# Patient Record
Sex: FEMALE | Race: WHITE | NOT HISPANIC OR LATINO | Employment: FULL TIME | URBAN - METROPOLITAN AREA
[De-identification: names, ages, dates, MRNs, and addresses within clinical notes are randomized per-mention and may not be internally consistent; named-entity substitution may affect disease eponyms.]

---

## 2023-12-07 PROBLEM — R60.0 LOWER EXTREMITY EDEMA: Status: ACTIVE | Noted: 2023-12-07

## 2023-12-07 PROBLEM — M48.02 SPINAL STENOSIS IN CERVICAL REGION: Status: ACTIVE | Noted: 2023-12-07

## 2023-12-07 PROBLEM — Z98.1 S/P CERVICAL SPINAL FUSION: Status: ACTIVE | Noted: 2023-12-07

## 2023-12-07 PROBLEM — J06.9 ACUTE URI: Status: ACTIVE | Noted: 2023-12-07

## 2023-12-07 PROBLEM — R94.39 ABNORMAL STRESS TEST: Status: ACTIVE | Noted: 2022-11-23

## 2023-12-07 PROBLEM — J40 SINOBRONCHITIS: Status: ACTIVE | Noted: 2023-12-07

## 2023-12-07 PROBLEM — J32.9 SINOBRONCHITIS: Status: ACTIVE | Noted: 2023-12-07

## 2023-12-07 PROBLEM — R19.7 DIARRHEA: Status: ACTIVE | Noted: 2023-12-07

## 2023-12-07 PROBLEM — I10 BENIGN ESSENTIAL HYPERTENSION: Status: ACTIVE | Noted: 2022-12-28

## 2023-12-07 PROBLEM — I49.9 IRREGULAR HEART BEAT: Status: ACTIVE | Noted: 2023-12-07

## 2023-12-07 PROBLEM — R05.3 PERSISTENT COUGH: Status: ACTIVE | Noted: 2023-12-07

## 2023-12-07 PROBLEM — I25.10 CORONARY ATHEROSCLEROSIS OF NATIVE CORONARY ARTERY: Status: ACTIVE | Noted: 2023-12-07

## 2023-12-07 PROBLEM — I82.409 DVT (DEEP VENOUS THROMBOSIS) (MULTI): Status: ACTIVE | Noted: 2023-12-07

## 2023-12-07 PROBLEM — R07.9 CHEST PAIN: Status: ACTIVE | Noted: 2022-12-12

## 2023-12-07 PROBLEM — K21.9 GASTROESOPHAGEAL REFLUX DISEASE: Status: ACTIVE | Noted: 2023-12-07

## 2023-12-07 PROBLEM — R32 INCONTINENCE IN FEMALE: Status: ACTIVE | Noted: 2023-12-07

## 2023-12-07 PROBLEM — R04.0 RECURRENT EPISTAXIS: Status: ACTIVE | Noted: 2023-12-07

## 2023-12-07 PROBLEM — J02.9 ACUTE PHARYNGITIS: Status: ACTIVE | Noted: 2023-12-07

## 2023-12-07 PROBLEM — E78.5 HYPERLIPIDEMIA: Status: ACTIVE | Noted: 2022-12-28

## 2023-12-07 PROBLEM — Z86.79 HISTORY OF HYPERTENSION: Status: ACTIVE | Noted: 2023-12-07

## 2023-12-07 PROBLEM — L29.9 EAR ITCHING: Status: ACTIVE | Noted: 2023-12-07

## 2023-12-07 RX ORDER — ALBUTEROL SULFATE 90 UG/1
AEROSOL, METERED RESPIRATORY (INHALATION)
COMMUNITY
Start: 2020-05-11 | End: 2023-12-21 | Stop reason: WASHOUT

## 2023-12-07 RX ORDER — DARIFENACIN 15 MG/1
15 TABLET, EXTENDED RELEASE ORAL
COMMUNITY
End: 2023-12-21 | Stop reason: WASHOUT

## 2023-12-07 RX ORDER — CALCIUM CARBONATE/VITAMIN D3 600MG-5MCG
TABLET ORAL
COMMUNITY
Start: 2017-07-17

## 2023-12-07 RX ORDER — HYDROCHLOROTHIAZIDE 25 MG/1
TABLET ORAL
COMMUNITY
Start: 2020-07-15 | End: 2023-12-21 | Stop reason: WASHOUT

## 2023-12-07 RX ORDER — FLUOXETINE HYDROCHLORIDE 20 MG/1
CAPSULE ORAL
COMMUNITY
Start: 2015-12-21

## 2023-12-07 RX ORDER — FUROSEMIDE 20 MG/1
TABLET ORAL EVERY 24 HOURS
COMMUNITY
Start: 2021-05-12

## 2023-12-07 RX ORDER — NIFEDIPINE 30 MG/1
30 TABLET, FILM COATED, EXTENDED RELEASE ORAL
COMMUNITY
Start: 2023-05-05 | End: 2023-12-21 | Stop reason: WASHOUT

## 2023-12-07 RX ORDER — DIPHENOXYLATE HYDROCHLORIDE AND ATROPINE SULFATE 2.5; .025 MG/1; MG/1
TABLET ORAL 4 TIMES DAILY PRN
COMMUNITY
Start: 2022-03-13 | End: 2023-12-21 | Stop reason: WASHOUT

## 2023-12-07 RX ORDER — PRAVASTATIN SODIUM 80 MG/1
1 TABLET ORAL NIGHTLY
COMMUNITY
Start: 2020-07-23

## 2023-12-07 RX ORDER — LOSARTAN POTASSIUM 50 MG/1
TABLET ORAL
COMMUNITY
Start: 2023-08-08 | End: 2023-12-21 | Stop reason: WASHOUT

## 2023-12-07 RX ORDER — POLYMYXIN B SULFATE AND TRIMETHOPRIM 1; 10000 MG/ML; [USP'U]/ML
1 SOLUTION OPHTHALMIC
COMMUNITY
Start: 2023-05-17 | End: 2023-12-21 | Stop reason: WASHOUT

## 2023-12-07 RX ORDER — LOSARTAN POTASSIUM 25 MG/1
TABLET ORAL
COMMUNITY
Start: 2020-07-15 | End: 2023-12-21 | Stop reason: WASHOUT

## 2023-12-07 RX ORDER — TRIAMTERENE AND HYDROCHLOROTHIAZIDE 37.5; 25 MG/1; MG/1
1 CAPSULE ORAL
COMMUNITY
End: 2023-12-21 | Stop reason: WASHOUT

## 2023-12-07 RX ORDER — NAPROXEN SODIUM 220 MG/1
TABLET, FILM COATED ORAL
COMMUNITY
Start: 2008-04-10

## 2023-12-07 RX ORDER — CETIRIZINE HYDROCHLORIDE 10 MG/1
TABLET ORAL
COMMUNITY
Start: 2020-07-15

## 2023-12-07 RX ORDER — MINERAL OIL
180 ENEMA (ML) RECTAL
COMMUNITY
End: 2023-12-21 | Stop reason: WASHOUT

## 2023-12-07 RX ORDER — TOLTERODINE TARTRATE 2 MG/1
4 TABLET, EXTENDED RELEASE ORAL
COMMUNITY
Start: 2017-07-17

## 2023-12-07 RX ORDER — OFLOXACIN 3 MG/ML
SOLUTION/ DROPS OPHTHALMIC
COMMUNITY
Start: 2023-05-17 | End: 2023-12-21 | Stop reason: WASHOUT

## 2023-12-07 RX ORDER — NIFEDIPINE 60 MG/1
60 TABLET, EXTENDED RELEASE ORAL DAILY
COMMUNITY
Start: 2022-12-21 | End: 2023-12-21 | Stop reason: WASHOUT

## 2023-12-07 RX ORDER — OMEPRAZOLE 20 MG/1
20 CAPSULE, DELAYED RELEASE ORAL DAILY
COMMUNITY

## 2023-12-07 RX ORDER — MOXIFLOXACIN 5 MG/ML
SOLUTION/ DROPS OPHTHALMIC
COMMUNITY
Start: 2023-06-05 | End: 2023-12-21 | Stop reason: WASHOUT

## 2023-12-07 RX ORDER — LOSARTAN POTASSIUM 100 MG/1
TABLET ORAL
COMMUNITY
Start: 2023-09-08

## 2023-12-07 RX ORDER — LOSARTAN POTASSIUM AND HYDROCHLOROTHIAZIDE 12.5; 5 MG/1; MG/1
1 TABLET ORAL
COMMUNITY
End: 2023-12-21 | Stop reason: WASHOUT

## 2023-12-18 NOTE — PROGRESS NOTES
Primary Cardiologist: Dr. Clifford    Chief Complaint:   No chief complaint on file.     History Of Present Illness:    Leigha Lazaro is a 53 y.o. female with past medical history of HTN, DL, chest pain (normal cath 7/2020), Symptomatic PVC's, LE edema, superficial blood clot in leg x3 (patient verifies no h/o DVT), anterior fusion neck surgery who presents for 6 month follow up.      Today, Leigha Lazaro is feeling well overall, no cardiac concerns, no recent hospitalizations.  Denies chest pain/pressure, no dizziness or lightheadedness, no shortness of breath, and no palpitations. She has chronic L>R lower extremity edema that is currently stable, denies orthopnea or PND.     Last Recorded Vitals:  Visit Vitals  /82   Pulse 91   Ht 1.829 m (6')   Wt 99.3 kg (219 lb)   BMI 29.70 kg/m²   Smoking Status Never   BSA 2.25 m²        Past Medical History:  She has a past medical history of Personal history of other diseases of the circulatory system.    Past Surgical History:  She has a past surgical history that includes Septoplasty (07/17/2017); Hysterectomy (07/17/2017); Bladder surgery (07/17/2017); and Tonsillectomy (07/17/2017).      Social History:  She reports that she has never smoked. She does not have any smokeless tobacco history on file. No history on file for alcohol use and drug use.    Family History:  No family history on file.     Allergies:  Penicillins, Erythromycin, and Nitrofurantoin monohyd/m-cryst    Outpatient Medications:  Current Outpatient Medications   Medication Instructions    aspirin 81 mg chewable tablet oral    calcium carbonate-vitamin D3 600 mg-5 mcg (200 unit) tablet oral    cetirizine (ZyrTEC) 10 mg tablet oral    FLUoxetine (PROzac) 20 mg capsule oral    furosemide (Lasix) 20 mg tablet Every 24 hours    losartan (Cozaar) 100 mg tablet     omeprazole (PRILOSEC) 20 mg, oral, Daily, Take (1) capsule by mouth once daily in the morning before breakfast.    pravastatin (Pravachol) 80  mg tablet 1 tablet, oral, Nightly    tolterodine (DETROL) 4 mg, oral         Review of Systems   Constitutional: Negative for malaise/fatigue.   HENT: Negative.     Eyes: Negative.    Cardiovascular:  Positive for leg swelling (chronic, stable). Negative for chest pain and dyspnea on exertion.   Respiratory:  Negative for shortness of breath.    Endocrine: Negative.    Hematologic/Lymphatic: Negative.    Skin: Negative.    Musculoskeletal: Negative.    Gastrointestinal: Negative.    Genitourinary: Negative.    Neurological: Negative.  Negative for dizziness and light-headedness.   Psychiatric/Behavioral: Negative.          Physical Exam  Vitals reviewed.   Constitutional:       Appearance: Normal appearance.   HENT:      Head: Normocephalic.      Mouth/Throat:      Mouth: Mucous membranes are moist.   Cardiovascular:      Rate and Rhythm: Normal rate and regular rhythm.      Pulses: Normal pulses.      Heart sounds: Normal heart sounds. No murmur heard.     No friction rub. No gallop.   Pulmonary:      Effort: Pulmonary effort is normal.      Breath sounds: Normal breath sounds. No wheezing, rhonchi or rales.   Abdominal:      General: Bowel sounds are normal.      Palpations: Abdomen is soft.   Musculoskeletal:         General: Normal range of motion.      Cervical back: Normal range of motion.      Right lower leg: No edema.      Left lower leg: No edema.   Skin:     General: Skin is warm and dry.   Neurological:      General: No focal deficit present.      Mental Status: She is alert and oriented to person, place, and time.   Psychiatric:         Mood and Affect: Mood normal.         Behavior: Behavior normal.         Thought Content: Thought content normal.         Judgment: Judgment normal.           Last Labs:  CBC -  Lab Results   Component Value Date    WBC 4.4 03/13/2022    HGB 12.9 03/13/2022    HCT 39.1 03/13/2022    MCV 90 03/13/2022     03/13/2022       CMP -  Lab Results   Component Value Date  "   CALCIUM 9.2 12/28/2022    PROT 6.8 03/13/2022    ALBUMIN 4.2 03/13/2022    AST 9 12/28/2022    ALT 11 12/28/2022    ALKPHOS 49 03/13/2022    BILITOT 0.4 03/13/2022       LIPID PANEL -   Lab Results   Component Value Date    CHOL 178 12/28/2022    TRIG 78 12/28/2022    HDL 71.7 12/28/2022    CHHDL 2.5 12/28/2022    LDLF 91 12/28/2022    VLDL 16 12/28/2022       RENAL FUNCTION PANEL -   Lab Results   Component Value Date    GLUCOSE 83 12/28/2022     12/28/2022    K 4.0 12/28/2022     12/28/2022    CO2 28 12/28/2022    ANIONGAP 10 12/28/2022    BUN 16 12/28/2022    CREATININE 0.76 12/28/2022    CALCIUM 9.2 12/28/2022    ALBUMIN 4.2 03/13/2022        No results found for: \"BNP\", \"HGBA1C\"    Last Cardiology Tests:  ECG:  No results found for this or any previous visit from the past 1095 days.      Echo:  No results found for this or any previous visit from the past 1095 days.      Ejection Fractions:  No results found for: \"EF\"    Cath:  [facility] 7/31/20 :No evidence of significant coronary artery disease.     Stress Test:  Exercise stress echo   Abnormal ECG with stress.   2. No clinical or echocardiographic evidence for ischemia at maximal infusion.   3. The adequate level of stress was achieved.   4. The Rome score is 5.  Cardiac Imaging:  No results found for this or any previous visit from the past 1095 days.        Lab review: I have personally reviewed the laboratory result(s)     Assessment/Plan   Leigha Lazaro is a 53 y.o. female with past medical history of HTN, DL, chest pain (normal cath 7/2020), Symptomatic PVC's, LE edema, superficial blood clot in leg x3 (patient verifies no h/o DVT), anterior fusion neck surgery who presents for 6 month follow up.    History of atypical chest pain   C 7/20: with no evidence of CAD  Exercise stress echo 12/22: No clinical or echo evidence for ischemia  Chest pain at last office visit improved after PPI   Today, denies any chest pain or pressure, " stable  Stable    Hypertension  Today's /82  Well controlled  Continue on current antihypertensives    Dyslipidemia  Lipid panel 12/20/2022: Cholesterol 178 HDL 71 LDL 91 and triglycerides 78  Continue on pravastatin, repeat FLP ordered    Chronic venous insufficiency  Patient has chronic lower extremity edema L>R and takes furosemide 20 mg daily which she states mostly controls her edema.  Today she states that during the summer months the edema is very bothersome to her and she thinks maybe she needs increase dose of furosemide for summer.   Will refer to vascular surgery for further evaluation and available treatment options.       Lora Napoles, APRN-CNP

## 2023-12-21 ENCOUNTER — OFFICE VISIT (OUTPATIENT)
Dept: CARDIOLOGY | Facility: CLINIC | Age: 53
End: 2023-12-21
Payer: COMMERCIAL

## 2023-12-21 VITALS
SYSTOLIC BLOOD PRESSURE: 120 MMHG | WEIGHT: 219 LBS | DIASTOLIC BLOOD PRESSURE: 82 MMHG | HEART RATE: 91 BPM | HEIGHT: 72 IN | BODY MASS INDEX: 29.66 KG/M2

## 2023-12-21 DIAGNOSIS — I10 BENIGN ESSENTIAL HYPERTENSION: Primary | ICD-10-CM

## 2023-12-21 DIAGNOSIS — I87.2 VENOUS INSUFFICIENCY: ICD-10-CM

## 2023-12-21 DIAGNOSIS — E78.5 HYPERLIPIDEMIA, UNSPECIFIED HYPERLIPIDEMIA TYPE: ICD-10-CM

## 2023-12-21 PROCEDURE — 3079F DIAST BP 80-89 MM HG: CPT | Performed by: CLINICAL NURSE SPECIALIST

## 2023-12-21 PROCEDURE — 3074F SYST BP LT 130 MM HG: CPT | Performed by: CLINICAL NURSE SPECIALIST

## 2023-12-21 PROCEDURE — 99213 OFFICE O/P EST LOW 20 MIN: CPT | Performed by: CLINICAL NURSE SPECIALIST

## 2023-12-21 ASSESSMENT — ENCOUNTER SYMPTOMS
HEMATOLOGIC/LYMPHATIC NEGATIVE: 1
EYES NEGATIVE: 1
ENDOCRINE NEGATIVE: 1
LIGHT-HEADEDNESS: 0
GASTROINTESTINAL NEGATIVE: 1
NEUROLOGICAL NEGATIVE: 1
MUSCULOSKELETAL NEGATIVE: 1
SHORTNESS OF BREATH: 0
DIZZINESS: 0
DYSPNEA ON EXERTION: 0
PSYCHIATRIC NEGATIVE: 1

## 2023-12-21 NOTE — PATIENT INSTRUCTIONS
Labs have been ordered for you to complete, please do fasting.  Call our office with any new cardiac concerns  Continue current medications  Continue heart-healthy diet. A diet low in sodium, low in cholesterol, limiting red meats and eating whole foods.   Follow up with Dr. Clifford in one year.

## 2023-12-22 ENCOUNTER — HOSPITAL ENCOUNTER (OUTPATIENT)
Dept: RADIOLOGY | Facility: HOSPITAL | Age: 53
Discharge: HOME | End: 2023-12-22
Payer: COMMERCIAL

## 2023-12-22 DIAGNOSIS — Z12.31 ENCOUNTER FOR SCREENING MAMMOGRAM FOR MALIGNANT NEOPLASM OF BREAST: ICD-10-CM

## 2023-12-22 PROCEDURE — 77067 SCR MAMMO BI INCL CAD: CPT

## 2023-12-22 PROCEDURE — 77063 BREAST TOMOSYNTHESIS BI: CPT | Mod: BILATERAL PROCEDURE | Performed by: RADIOLOGY

## 2023-12-22 PROCEDURE — 77067 SCR MAMMO BI INCL CAD: CPT | Mod: BILATERAL PROCEDURE | Performed by: RADIOLOGY

## 2023-12-29 ENCOUNTER — LAB (OUTPATIENT)
Dept: LAB | Facility: LAB | Age: 53
End: 2023-12-29
Payer: COMMERCIAL

## 2023-12-29 DIAGNOSIS — E78.5 HYPERLIPIDEMIA, UNSPECIFIED HYPERLIPIDEMIA TYPE: ICD-10-CM

## 2023-12-29 LAB
ALBUMIN SERPL BCP-MCNC: 4.2 G/DL (ref 3.4–5)
ALP SERPL-CCNC: 63 U/L (ref 33–110)
ALT SERPL W P-5'-P-CCNC: 12 U/L (ref 7–45)
ANION GAP SERPL CALC-SCNC: 11 MMOL/L (ref 10–20)
AST SERPL W P-5'-P-CCNC: 10 U/L (ref 9–39)
BILIRUB SERPL-MCNC: 0.7 MG/DL (ref 0–1.2)
BUN SERPL-MCNC: 13 MG/DL (ref 6–23)
CALCIUM SERPL-MCNC: 9.1 MG/DL (ref 8.6–10.3)
CHLORIDE SERPL-SCNC: 104 MMOL/L (ref 98–107)
CHOLEST SERPL-MCNC: 185 MG/DL (ref 0–199)
CHOLESTEROL/HDL RATIO: 3
CO2 SERPL-SCNC: 30 MMOL/L (ref 21–32)
CREAT SERPL-MCNC: 0.64 MG/DL (ref 0.5–1.05)
GFR SERPL CREATININE-BSD FRML MDRD: >90 ML/MIN/1.73M*2
GLUCOSE SERPL-MCNC: 85 MG/DL (ref 74–99)
HDLC SERPL-MCNC: 62.4 MG/DL
LDLC SERPL CALC-MCNC: 105 MG/DL
NON HDL CHOLESTEROL: 123 MG/DL (ref 0–149)
POTASSIUM SERPL-SCNC: 4.1 MMOL/L (ref 3.5–5.3)
PROT SERPL-MCNC: 6.5 G/DL (ref 6.4–8.2)
SODIUM SERPL-SCNC: 141 MMOL/L (ref 136–145)
TRIGL SERPL-MCNC: 86 MG/DL (ref 0–149)
VLDL: 17 MG/DL (ref 0–40)

## 2023-12-29 PROCEDURE — 80061 LIPID PANEL: CPT

## 2023-12-29 PROCEDURE — 80053 COMPREHEN METABOLIC PANEL: CPT

## 2023-12-29 PROCEDURE — 36415 COLL VENOUS BLD VENIPUNCTURE: CPT

## 2024-03-08 ENCOUNTER — OFFICE VISIT (OUTPATIENT)
Dept: PODIATRY | Facility: CLINIC | Age: 54
End: 2024-03-08
Payer: COMMERCIAL

## 2024-03-08 VITALS — BODY MASS INDEX: 29.66 KG/M2 | WEIGHT: 219 LBS | HEIGHT: 72 IN

## 2024-03-08 DIAGNOSIS — L60.0 INGROWING NAIL: Primary | ICD-10-CM

## 2024-03-08 DIAGNOSIS — M79.675 TOE PAIN, LEFT: ICD-10-CM

## 2024-03-08 PROCEDURE — 11730 AVULSION NAIL PLATE SIMPLE 1: CPT | Performed by: PODIATRIST

## 2024-03-08 PROCEDURE — 99202 OFFICE O/P NEW SF 15 MIN: CPT | Performed by: PODIATRIST

## 2024-03-10 NOTE — PROGRESS NOTES
CC: left ingrown nail(s).     HPI:  Patient presents complaining of loose and ingrown nail of the left hallux, been present for a period of time, no active odor or drainage present. Has tried some soaks and otc meds.    PCP: Dr. Luna  Last visit: 12-1-23     PMH  Past Medical History:   Diagnosis Date    Personal history of other diseases of the circulatory system     H/O: HTN (hypertension)     MEDS    Current Outpatient Medications:     aspirin 81 mg chewable tablet, Chew., Disp: , Rfl:     calcium carbonate-vitamin D3 600 mg-5 mcg (200 unit) tablet, Take by mouth., Disp: , Rfl:     cetirizine (ZyrTEC) 10 mg tablet, Take by mouth., Disp: , Rfl:     FLUoxetine (PROzac) 20 mg capsule, Take by mouth., Disp: , Rfl:     furosemide (Lasix) 20 mg tablet, once every 24 hours., Disp: , Rfl:     losartan (Cozaar) 100 mg tablet, , Disp: , Rfl:     omeprazole (PriLOSEC) 20 mg DR capsule, Take 1 capsule (20 mg) by mouth once daily. Take (1) capsule by mouth once daily in the morning before breakfast., Disp: , Rfl:     pravastatin (Pravachol) 80 mg tablet, Take 1 tablet (80 mg) by mouth once daily at bedtime., Disp: , Rfl:     tolterodine (Detrol) 2 mg tablet, Take 2 tablets (4 mg) by mouth., Disp: , Rfl:   Allergies  Allergies   Allergen Reactions    Penicillins Anaphylaxis    Erythromycin Other    Nitrofurantoin Monohyd/M-Cryst Other and Unknown     Social History     Socioeconomic History    Marital status:      Spouse name: None    Number of children: None    Years of education: None    Highest education level: None   Occupational History    None   Tobacco Use    Smoking status: Never    Smokeless tobacco: None   Substance and Sexual Activity    Alcohol use: None    Drug use: None    Sexual activity: None   Other Topics Concern    None   Social History Narrative    None     Social Determinants of Health     Financial Resource Strain: Not on file   Food Insecurity: Not on file   Transportation Needs: Not on file    Physical Activity: Not on file   Stress: Not on file   Social Connections: Not on file   Intimate Partner Violence: Not on file   Housing Stability: Not on file     Family History   Problem Relation Name Age of Onset    Breast cancer Mother       Past Surgical History:   Procedure Laterality Date    BLADDER SURGERY  07/17/2017    Bladder Surgery    BREAST BIOPSY Left     excisional    HYSTERECTOMY  07/17/2017    Hysterectomy    SEPTOPLASTY  07/17/2017    Septoplasty    TONSILLECTOMY  07/17/2017    Tonsillectomy With Adenoidectomy       REVIEW OF SYSTEMS    DERM:   + as noted in HPI.       Physical examination:   On General Observation: Patient is a pleasant, cooperative, well developed 53 y.o.  adult female. The patient is alert and oriented to time, place and person. Patient has normal affect and mood.  Ht 1.829 m (6')   Wt 99.3 kg (219 lb)   BMI 29.70 kg/m²     Vascular:  DP and PT pulses are 2/4 b/l.  no edema noted. mild varicosities b/l.  CFT  5 seconds to all digits bilateral.  Skin temperature is warm to warm from proximal to distal bilateral.      Muscular: Strength is 5/5 for all instrinsic and extrinsic muscle groups. Tenderness on palpation to the  left hallux toenail.     Neuro: Light touch present bilateral.     Derm: thick and ingrown nail left hallux with lysis present to the distal portion, no odor or drainage present,        ASSESSMENT:    Ingrowning nail [L60.0]     Pain in left toe(s) [M79.675]      PLAN:   Consult  A comprehensive history and physical examination were preformed. The patient was educated on clinical findings, diagnosis and treatment plans. Patient understands all that has been explained and all questions were answered to apparent satisfaction.     - Educated to perform soaks at home daily with epsom salt.   - nail avulsion performed. See below.   - follow up in 2 weeks       Procedure:   All risks, benefits, alternatives and potential complications were discussed. All  questions answered and patient opted for permanent total nail avulsion. Consent form was reviewed and signed. A local block was injected and infiltrated in a typical  digital block fashion using 3cc  of 2% lidocaine plain and 0.5% bupiviciane plain left hallux.  Area was prepped with betadine. A freer elevator was used to free the nail from the underlying nailbed. The  nail was grasped with a hemostat and removed. A curette was then used to inspect the nailbed and no debris remained. The area was then flushed with copious amounts of saline. Area was dressed with bacitracin,   gauze, and coban. Patient tolerated the procedure well without any complications. Home going care instructions dispensed to patient.     Darius Clayton DPM

## 2024-03-22 ENCOUNTER — OFFICE VISIT (OUTPATIENT)
Dept: PODIATRY | Facility: CLINIC | Age: 54
End: 2024-03-22
Payer: COMMERCIAL

## 2024-03-22 DIAGNOSIS — L60.0 INGROWING NAIL: Primary | ICD-10-CM

## 2024-03-22 PROCEDURE — 99212 OFFICE O/P EST SF 10 MIN: CPT | Performed by: PODIATRIST

## 2024-03-22 NOTE — PROGRESS NOTES
CC: left ingrown    HPI:  Patient seen follow up left ingrown, doing the soaks and wound care.  PCP: Dr. Luna  Last visit: 2024     PMH  Past Medical History:   Diagnosis Date    Personal history of other diseases of the circulatory system     H/O: HTN (hypertension)     MEDS    Current Outpatient Medications:     aspirin 81 mg chewable tablet, Chew., Disp: , Rfl:     calcium carbonate-vitamin D3 600 mg-5 mcg (200 unit) tablet, Take by mouth., Disp: , Rfl:     cetirizine (ZyrTEC) 10 mg tablet, Take by mouth., Disp: , Rfl:     FLUoxetine (PROzac) 20 mg capsule, Take by mouth., Disp: , Rfl:     furosemide (Lasix) 20 mg tablet, once every 24 hours., Disp: , Rfl:     losartan (Cozaar) 100 mg tablet, , Disp: , Rfl:     omeprazole (PriLOSEC) 20 mg DR capsule, Take 1 capsule (20 mg) by mouth once daily. Take (1) capsule by mouth once daily in the morning before breakfast., Disp: , Rfl:     pravastatin (Pravachol) 80 mg tablet, Take 1 tablet (80 mg) by mouth once daily at bedtime., Disp: , Rfl:     tolterodine (Detrol) 2 mg tablet, Take 2 tablets (4 mg) by mouth., Disp: , Rfl:   Allergies  Allergies   Allergen Reactions    Penicillins Anaphylaxis    Erythromycin Other    Nitrofurantoin Monohyd/M-Cryst Other and Unknown     Social History     Socioeconomic History    Marital status:      Spouse name: None    Number of children: None    Years of education: None    Highest education level: None   Occupational History    None   Tobacco Use    Smoking status: Never    Smokeless tobacco: None   Substance and Sexual Activity    Alcohol use: None    Drug use: None    Sexual activity: None   Other Topics Concern    None   Social History Narrative    None     Social Determinants of Health     Financial Resource Strain: Not on file   Food Insecurity: Not on file   Transportation Needs: Not on file   Physical Activity: Not on file   Stress: Not on file   Social Connections: Not on file   Intimate Partner Violence: Not on file    Housing Stability: Not on file     Family History   Problem Relation Name Age of Onset    Breast cancer Mother       Past Surgical History:   Procedure Laterality Date    BLADDER SURGERY  07/17/2017    Bladder Surgery    BREAST BIOPSY Left     excisional    HYSTERECTOMY  07/17/2017    Hysterectomy    SEPTOPLASTY  07/17/2017    Septoplasty    TONSILLECTOMY  07/17/2017    Tonsillectomy With Adenoidectomy       REVIEW OF SYSTEMS    + as noted above in HPI.       Physical examination:   On General Observation: Patient is a pleasant, cooperative, well developed 53 y.o.  adult female. The patient is alert and oriented to time, place and person. Patient has normal affect and mood.  There were no vitals taken for this visit.    Vascular:  DP and PT pulses are 2/4 b/l.  no edema noted. no varicosities b/l.  CFT  5 seconds to all digits bilateral.  Skin temperature is warm to warm from proximal to distal bilateral.      Muscular: Strength is 5/5 b/l.  Motor strength is normal and symmetric proximally, as well as distally, in all four extremities. Good mobility of all extremities.      Neuro:  Proprioception present.   Sensation to vibration is  present. Protective sensation intact  at all pedal sites via [place] Violeta 5.07 monofilament bilateral.  Light touch presnet bilateral.     Derm:  left hallux nail fibrogranular nail bed, no odor or drainage, no pain        ASSESSMENT:    L60.0     PLAN:   Exam  Reviewed with pt  Continue soaks and wound care for 3-4 days in am, leave open to air at night  Can stop all treatment in 5 days  Follow up for laser 8-10 weeks      Darius Clayton DPM

## 2024-04-19 ENCOUNTER — LAB (OUTPATIENT)
Dept: LAB | Facility: LAB | Age: 54
End: 2024-04-19
Payer: COMMERCIAL

## 2024-04-19 DIAGNOSIS — I10 ESSENTIAL (PRIMARY) HYPERTENSION: ICD-10-CM

## 2024-04-19 DIAGNOSIS — Z00.00 ENCOUNTER FOR GENERAL ADULT MEDICAL EXAMINATION WITHOUT ABNORMAL FINDINGS: ICD-10-CM

## 2024-04-19 DIAGNOSIS — E78.00 PURE HYPERCHOLESTEROLEMIA, UNSPECIFIED: ICD-10-CM

## 2024-04-19 DIAGNOSIS — Z00.00 ENCOUNTER FOR GENERAL ADULT MEDICAL EXAMINATION WITHOUT ABNORMAL FINDINGS: Primary | ICD-10-CM

## 2024-04-19 LAB
ALBUMIN SERPL BCP-MCNC: 4.2 G/DL (ref 3.4–5)
ALP SERPL-CCNC: 59 U/L (ref 33–110)
ALT SERPL W P-5'-P-CCNC: 12 U/L (ref 7–45)
ANION GAP SERPL CALC-SCNC: 9 MMOL/L (ref 10–20)
AST SERPL W P-5'-P-CCNC: 15 U/L (ref 9–39)
BASOPHILS # BLD AUTO: 0.04 X10*3/UL (ref 0–0.1)
BASOPHILS NFR BLD AUTO: 0.8 %
BILIRUB SERPL-MCNC: 0.7 MG/DL (ref 0–1.2)
BUN SERPL-MCNC: 15 MG/DL (ref 6–23)
CALCIUM SERPL-MCNC: 9 MG/DL (ref 8.6–10.3)
CHLORIDE SERPL-SCNC: 106 MMOL/L (ref 98–107)
CHOLEST SERPL-MCNC: 190 MG/DL (ref 0–199)
CHOLESTEROL/HDL RATIO: 2.9
CO2 SERPL-SCNC: 30 MMOL/L (ref 21–32)
CREAT SERPL-MCNC: 0.67 MG/DL (ref 0.5–1.05)
EGFRCR SERPLBLD CKD-EPI 2021: >90 ML/MIN/1.73M*2
EOSINOPHIL # BLD AUTO: 0.51 X10*3/UL (ref 0–0.7)
EOSINOPHIL NFR BLD AUTO: 9.8 %
ERYTHROCYTE [DISTWIDTH] IN BLOOD BY AUTOMATED COUNT: 12.9 % (ref 11.5–14.5)
GLUCOSE SERPL-MCNC: 82 MG/DL (ref 74–99)
HCT VFR BLD AUTO: 38.9 % (ref 36–46)
HDLC SERPL-MCNC: 65.8 MG/DL
HGB BLD-MCNC: 12.1 G/DL (ref 12–16)
IMM GRANULOCYTES # BLD AUTO: 0 X10*3/UL (ref 0–0.7)
IMM GRANULOCYTES NFR BLD AUTO: 0 % (ref 0–0.9)
LDLC SERPL CALC-MCNC: 105 MG/DL
LYMPHOCYTES # BLD AUTO: 1.49 X10*3/UL (ref 1.2–4.8)
LYMPHOCYTES NFR BLD AUTO: 28.6 %
MCH RBC QN AUTO: 28.6 PG (ref 26–34)
MCHC RBC AUTO-ENTMCNC: 31.1 G/DL (ref 32–36)
MCV RBC AUTO: 92 FL (ref 80–100)
MONOCYTES # BLD AUTO: 0.35 X10*3/UL (ref 0.1–1)
MONOCYTES NFR BLD AUTO: 6.7 %
NEUTROPHILS # BLD AUTO: 2.82 X10*3/UL (ref 1.2–7.7)
NEUTROPHILS NFR BLD AUTO: 54.1 %
NON HDL CHOLESTEROL: 124 MG/DL (ref 0–149)
NRBC BLD-RTO: 0 /100 WBCS (ref 0–0)
PLATELET # BLD AUTO: 295 X10*3/UL (ref 150–450)
POTASSIUM SERPL-SCNC: 4.3 MMOL/L (ref 3.5–5.3)
PROT SERPL-MCNC: 6.8 G/DL (ref 6.4–8.2)
RBC # BLD AUTO: 4.23 X10*6/UL (ref 4–5.2)
SODIUM SERPL-SCNC: 141 MMOL/L (ref 136–145)
TRIGL SERPL-MCNC: 96 MG/DL (ref 0–149)
VLDL: 19 MG/DL (ref 0–40)
WBC # BLD AUTO: 5.2 X10*3/UL (ref 4.4–11.3)

## 2024-04-19 PROCEDURE — 85025 COMPLETE CBC W/AUTO DIFF WBC: CPT

## 2024-04-19 PROCEDURE — 36415 COLL VENOUS BLD VENIPUNCTURE: CPT

## 2024-04-19 PROCEDURE — 80053 COMPREHEN METABOLIC PANEL: CPT

## 2024-04-19 PROCEDURE — 80061 LIPID PANEL: CPT

## 2024-05-30 ENCOUNTER — OFFICE VISIT (OUTPATIENT)
Dept: PODIATRY | Facility: CLINIC | Age: 54
End: 2024-05-30

## 2024-05-30 VITALS — WEIGHT: 216 LBS | BODY MASS INDEX: 30.24 KG/M2 | HEIGHT: 71 IN

## 2024-05-30 DIAGNOSIS — B35.1 TINEA UNGUIUM: Primary | ICD-10-CM

## 2024-05-30 PROCEDURE — LASER1: Performed by: PODIATRIST

## 2024-05-30 NOTE — PROGRESS NOTES
CC: left hallux laser     HPI:  Patient seen follow up for laser left hallux.  PCP: Dr. Luna  Last visit: 2024      PMH  Medical History        Past Medical History:   Diagnosis Date    Personal history of other diseases of the circulatory system       H/O: HTN (hypertension)         MEDS     Current Outpatient Medications:     aspirin 81 mg chewable tablet, Chew., Disp: , Rfl:     calcium carbonate-vitamin D3 600 mg-5 mcg (200 unit) tablet, Take by mouth., Disp: , Rfl:     cetirizine (ZyrTEC) 10 mg tablet, Take by mouth., Disp: , Rfl:     FLUoxetine (PROzac) 20 mg capsule, Take by mouth., Disp: , Rfl:     furosemide (Lasix) 20 mg tablet, once every 24 hours., Disp: , Rfl:     losartan (Cozaar) 100 mg tablet, , Disp: , Rfl:     omeprazole (PriLOSEC) 20 mg DR capsule, Take 1 capsule (20 mg) by mouth once daily. Take (1) capsule by mouth once daily in the morning before breakfast., Disp: , Rfl:     pravastatin (Pravachol) 80 mg tablet, Take 1 tablet (80 mg) by mouth once daily at bedtime., Disp: , Rfl:     tolterodine (Detrol) 2 mg tablet, Take 2 tablets (4 mg) by mouth., Disp: , Rfl:   Allergies       Allergies   Allergen Reactions    Penicillins Anaphylaxis    Erythromycin Other    Nitrofurantoin Monohyd/M-Cryst Other and Unknown      Social History   Social History            Socioeconomic History    Marital status:        Spouse name: None    Number of children: None    Years of education: None    Highest education level: None   Occupational History    None   Tobacco Use    Smoking status: Never    Smokeless tobacco: None   Substance and Sexual Activity    Alcohol use: None    Drug use: None    Sexual activity: None   Other Topics Concern    None   Social History Narrative    None      Social Determinants of Health      Financial Resource Strain: Not on file   Food Insecurity: Not on file   Transportation Needs: Not on file   Physical Activity: Not on file   Stress: Not on file   Social Connections: Not on  file   Intimate Partner Violence: Not on file   Housing Stability: Not on file         Family History          Family History   Problem Relation Name Age of Onset    Breast cancer Mother             Surgical History         Past Surgical History:   Procedure Laterality Date    BLADDER SURGERY   07/17/2017     Bladder Surgery    BREAST BIOPSY Left       excisional    HYSTERECTOMY   07/17/2017     Hysterectomy    SEPTOPLASTY   07/17/2017     Septoplasty    TONSILLECTOMY   07/17/2017     Tonsillectomy With Adenoidectomy            REVIEW OF SYSTEMS     + as noted above in HPI.         Physical examination:   On General Observation: Patient is a pleasant, cooperative, well developed 53 y.o.  adult female. The patient is alert and oriented to time, place and person. Patient has normal affect and mood.  There were no vitals taken for this visit.     Vascular:  DP and PT pulses are 2/4 b/l.  no edema noted. no varicosities b/l.  CFT  5 seconds to all digits bilateral.  Skin temperature is warm to warm from proximal to distal bilateral.       Muscular: Strength is 5/5 b/l.  Motor strength is normal and symmetric proximally, as well as distally, in all four extremities. Good mobility of all extremities.       Neuro:  Proprioception present.   Sensation to vibration is  present. Protective sensation intact  at all pedal sites via [place] Violeta 5.07 monofilament bilateral.  Light touch presnet bilateral.      Derm:  left hallux nail bed is dry, healed        ASSESSMENT:    B35.1     PLAN:   Consent reviewed and signed [facility] laser, pre laser picture obtained, laser done, no complications will start topical formula3  Follow up 6 months.        Darius Clayton DPM

## 2024-06-04 ENCOUNTER — HOSPITAL ENCOUNTER (OUTPATIENT)
Dept: RADIOLOGY | Facility: HOSPITAL | Age: 54
Discharge: HOME | End: 2024-06-04
Payer: COMMERCIAL

## 2024-06-04 VITALS — BODY MASS INDEX: 30.4 KG/M2 | WEIGHT: 218 LBS

## 2024-06-04 DIAGNOSIS — R10.11 RIGHT UPPER QUADRANT PAIN: ICD-10-CM

## 2024-06-04 PROCEDURE — 3430000001 HC RX 343 DIAGNOSTIC RADIOPHARMACEUTICALS: Performed by: STUDENT IN AN ORGANIZED HEALTH CARE EDUCATION/TRAINING PROGRAM

## 2024-06-04 PROCEDURE — 78227 HEPATOBIL SYST IMAGE W/DRUG: CPT

## 2024-06-04 PROCEDURE — 78227 HEPATOBIL SYST IMAGE W/DRUG: CPT | Performed by: STUDENT IN AN ORGANIZED HEALTH CARE EDUCATION/TRAINING PROGRAM

## 2024-06-04 PROCEDURE — A9537 TC99M MEBROFENIN: HCPCS | Performed by: STUDENT IN AN ORGANIZED HEALTH CARE EDUCATION/TRAINING PROGRAM

## 2024-06-04 RX ORDER — KIT FOR THE PREPARATION OF TECHNETIUM TC 99M MEBROFENIN 45 MG/10ML
5 INJECTION, POWDER, LYOPHILIZED, FOR SOLUTION INTRAVENOUS
Status: COMPLETED | OUTPATIENT
Start: 2024-06-04 | End: 2024-06-04

## 2024-06-04 RX ORDER — SINCALIDE 5 UG/5ML
0.02 INJECTION, POWDER, LYOPHILIZED, FOR SOLUTION INTRAVENOUS ONCE
Status: DISCONTINUED | OUTPATIENT
Start: 2024-06-04 | End: 2024-06-05 | Stop reason: HOSPADM

## 2024-06-04 RX ADMIN — KIT FOR THE PREPARATION OF TECHNETIUM TC 99M MEBROFENIN 5 MILLICURIE: 45 INJECTION, POWDER, LYOPHILIZED, FOR SOLUTION INTRAVENOUS at 10:25

## 2024-08-29 ENCOUNTER — HOSPITAL ENCOUNTER (OUTPATIENT)
Dept: RADIOLOGY | Facility: HOSPITAL | Age: 54
Discharge: HOME | End: 2024-08-29
Payer: COMMERCIAL

## 2024-08-29 DIAGNOSIS — R07.81 PLEURODYNIA: ICD-10-CM

## 2024-08-29 PROCEDURE — 71110 X-RAY EXAM RIBS BIL 3 VIEWS: CPT

## 2024-08-29 PROCEDURE — 72072 X-RAY EXAM THORAC SPINE 3VWS: CPT

## 2024-11-22 ENCOUNTER — APPOINTMENT (OUTPATIENT)
Dept: PODIATRY | Facility: CLINIC | Age: 54
End: 2024-11-22
Payer: COMMERCIAL

## 2024-11-22 DIAGNOSIS — B35.1 TINEA UNGUIUM: ICD-10-CM

## 2024-11-22 PROCEDURE — 99212 OFFICE O/P EST SF 10 MIN: CPT | Performed by: PODIATRIST

## 2024-11-22 NOTE — PROGRESS NOTES
CC: left hallux laser     HPI:  Patient seen follow up for laser left hallux.  PCP: Dr. Luna  Last visit: 2024      PMH  Medical History           Past Medical History:   Diagnosis Date    Personal history of other diseases of the circulatory system       H/O: HTN (hypertension)         MEDS     Current Outpatient Medications:     aspirin 81 mg chewable tablet, Chew., Disp: , Rfl:     calcium carbonate-vitamin D3 600 mg-5 mcg (200 unit) tablet, Take by mouth., Disp: , Rfl:     cetirizine (ZyrTEC) 10 mg tablet, Take by mouth., Disp: , Rfl:     FLUoxetine (PROzac) 20 mg capsule, Take by mouth., Disp: , Rfl:     furosemide (Lasix) 20 mg tablet, once every 24 hours., Disp: , Rfl:     losartan (Cozaar) 100 mg tablet, , Disp: , Rfl:     omeprazole (PriLOSEC) 20 mg DR capsule, Take 1 capsule (20 mg) by mouth once daily. Take (1) capsule by mouth once daily in the morning before breakfast., Disp: , Rfl:     pravastatin (Pravachol) 80 mg tablet, Take 1 tablet (80 mg) by mouth once daily at bedtime., Disp: , Rfl:     tolterodine (Detrol) 2 mg tablet, Take 2 tablets (4 mg) by mouth., Disp: , Rfl:   Allergies          Allergies   Allergen Reactions    Penicillins Anaphylaxis    Erythromycin Other    Nitrofurantoin Monohyd/M-Cryst Other and Unknown      Social History   Social History                Socioeconomic History    Marital status:        Spouse name: None    Number of children: None    Years of education: None    Highest education level: None   Occupational History    None   Tobacco Use    Smoking status: Never    Smokeless tobacco: None   Substance and Sexual Activity    Alcohol use: None    Drug use: None    Sexual activity: None   Other Topics Concern    None   Social History Narrative    None      Social Determinants of Health      Financial Resource Strain: Not on file   Food Insecurity: Not on file   Transportation Needs: Not on file   Physical Activity: Not on file   Stress: Not on file   Social  Connections: Not on file   Intimate Partner Violence: Not on file   Housing Stability: Not on file         Family History               Family History   Problem Relation Name Age of Onset    Breast cancer Mother             Surgical History             Past Surgical History:   Procedure Laterality Date    BLADDER SURGERY   07/17/2017     Bladder Surgery    BREAST BIOPSY Left       excisional    HYSTERECTOMY   07/17/2017     Hysterectomy    SEPTOPLASTY   07/17/2017     Septoplasty    TONSILLECTOMY   07/17/2017     Tonsillectomy With Adenoidectomy            REVIEW OF SYSTEMS     + as noted above in HPI.         Physical examination:   On General Observation: Patient is a pleasant, cooperative, well developed 53 y.o.  adult female. The patient is alert and oriented to time, place and person. Patient has normal affect and mood.  There were no vitals taken for this visit.     Vascular:  DP and PT pulses are 2/4 b/l.  no edema noted. no varicosities b/l.  CFT  5 seconds to all digits bilateral.  Skin temperature is warm to warm from proximal to distal bilateral.       Muscular: Strength is 5/5 b/l.  Motor strength is normal and symmetric proximally, as well as distally, in all four extremities. Good mobility of all extremities.       Neuro:  Proprioception present.   Sensation to vibration is  present. Protective sensation intact  at all pedal sites via [place] Violeta 5.07 monofilament bilateral.  Light touch presnet bilateral.      Derm:  left hallux nail bed is dry, healed        ASSESSMENT:    B35.1     PLAN:   Exam  Continue the topical formula3  Follow up for laser retouch.        Darius Clayton DPM

## 2024-11-29 ENCOUNTER — APPOINTMENT (OUTPATIENT)
Dept: PODIATRY | Facility: CLINIC | Age: 54
End: 2024-11-29
Payer: COMMERCIAL

## 2024-12-19 ENCOUNTER — APPOINTMENT (OUTPATIENT)
Dept: CARDIOLOGY | Facility: CLINIC | Age: 54
End: 2024-12-19
Payer: COMMERCIAL

## 2024-12-26 ENCOUNTER — HOSPITAL ENCOUNTER (OUTPATIENT)
Dept: RADIOLOGY | Facility: HOSPITAL | Age: 54
Discharge: HOME | End: 2024-12-26
Payer: COMMERCIAL

## 2024-12-26 VITALS — HEIGHT: 71 IN | BODY MASS INDEX: 30.8 KG/M2 | WEIGHT: 220 LBS

## 2024-12-26 DIAGNOSIS — Z12.31 ENCOUNTER FOR SCREENING MAMMOGRAM FOR MALIGNANT NEOPLASM OF BREAST: ICD-10-CM

## 2024-12-26 PROCEDURE — 77067 SCR MAMMO BI INCL CAD: CPT

## 2024-12-26 PROCEDURE — 77063 BREAST TOMOSYNTHESIS BI: CPT | Performed by: RADIOLOGY

## 2024-12-26 PROCEDURE — 77067 SCR MAMMO BI INCL CAD: CPT | Performed by: RADIOLOGY

## 2024-12-27 ENCOUNTER — APPOINTMENT (OUTPATIENT)
Dept: RADIOLOGY | Facility: HOSPITAL | Age: 54
End: 2024-12-27
Payer: COMMERCIAL

## 2025-01-08 NOTE — PROGRESS NOTES
Chief Complaint:   No chief complaint on file.     History Of Present Illness:    Leigha Lazaro is a 54 y.o. female presenting for yearly follow-up.  H/o HTN, DL, chest pain (normal cath 7/2020), Symptomatic PVC's, LE edema, superficial blood clot in leg x3 (patient verifies no h/o DVT), anterior fusion neck surgery.    Last seen in 12/2023 with XAVIER Napoles. Patient noted edema can be very bothersome in the summer and was considering increasing dose of furosemide during that time. She was referred to vascular surgery.     EKG today showed ST & T wave abnormality, consider inferior and anterior ischemia, unchanged from prior EKG to 4/2023. She reports SOB. Over the summer, she walked daily and had SOB with walking briskly or up a hill. She suspects she was out of shape. She also mentions heat intolerance. 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 Never smoker.    Since retiring, she has been taking care of her mother.    Review Of Systems:  The remainder of the review of systems was obtained, and was negative as pertains to the chief complaint.    CV testing and labs reviewed:    Labs 4/2024: K 4.3  BUN 15  Cr 0.67  ALT 12  AST 15   Lipid panel 4/2024:   HDL 65.8    TG 96    Exercise Stress Echo 12/2022:  Summary:   1. Abnormal ECG with stress.   2. No clinical or echocardiographic evidence for ischemia at maximal infusion.   3. The adequate level of stress was achieved.   4. The Rome score is 5.    [facility] 7/2020:  CONCLUSIONS:   1. No evidence of significant coronary artery disease.     Last Recorded Vitals:  There were no vitals filed for this visit.    Past Medical History:  She has a past medical history of Personal history of other diseases of the circulatory system.    Past Surgical History:  She has a past surgical history that includes Septoplasty (07/17/2017); Hysterectomy (07/17/2017); Bladder surgery (07/17/2017);  Tonsillectomy (07/17/2017); and Breast biopsy (Left).      Social History:  She reports that she has never smoked. She does not have any smokeless tobacco history on file. No history on file for alcohol use and drug use.    Family History:  Family History   Problem Relation Name Age of Onset    Breast cancer Mother          Allergies:  Penicillins, Erythromycin, and Nitrofurantoin monohyd/m-cryst    Outpatient Medications:  Current Outpatient Medications   Medication Instructions    aspirin 81 mg chewable tablet Chew.    calcium carbonate-vitamin D3 600 mg-5 mcg (200 unit) tablet Take by mouth.    cetirizine (ZyrTEC) 10 mg tablet Take by mouth.    FLUoxetine (PROzac) 20 mg capsule Take by mouth.    furosemide (Lasix) 20 mg tablet Every 24 hours    losartan (Cozaar) 100 mg tablet     omeprazole (PRILOSEC) 20 mg, Daily    pravastatin (Pravachol) 80 mg tablet 1 tablet, Nightly    tolterodine (DETROL) 4 mg    triamcinolone (Kenalog) 0.1 % cream Daily PRN       Physical Exam:  Physical Exam  HENT:      Head: Normocephalic.      Nose: Nose normal.      Mouth/Throat:      Mouth: Mucous membranes are moist.   Cardiovascular:      Rate and Rhythm: Normal rate and regular rhythm.      Heart sounds: Normal heart sounds.      Comments: Normal S1, S2, regular  No significant murmurs  No carotid bruits   No JVD  Pulmonary:      Effort: Pulmonary effort is normal.      Breath sounds: Normal breath sounds. No wheezing.   Abdominal:      Palpations: Abdomen is soft.   Musculoskeletal:         General: Normal range of motion.      Cervical back: Normal range of motion.   Skin:     General: Skin is warm and dry.   Neurological:      Mental Status: She is alert.   Psychiatric:         Mood and Affect: Mood normal.        Last Labs:  CBC -  Lab Results   Component Value Date    WBC 5.2 04/19/2024    HGB 12.1 04/19/2024    HCT 38.9 04/19/2024    MCV 92 04/19/2024     04/19/2024       CMP -  Lab Results   Component Value Date     "CALCIUM 9.0 04/19/2024    PROT 6.8 04/19/2024    ALBUMIN 4.2 04/19/2024    AST 15 04/19/2024    ALT 12 04/19/2024    ALKPHOS 59 04/19/2024    BILITOT 0.7 04/19/2024       LIPID PANEL -   Lab Results   Component Value Date    CHOL 190 04/19/2024    TRIG 96 04/19/2024    HDL 65.8 04/19/2024    CHHDL 2.9 04/19/2024    LDLF 91 12/28/2022    VLDL 19 04/19/2024    NHDL 124 04/19/2024       RENAL FUNCTION PANEL -   Lab Results   Component Value Date    GLUCOSE 82 04/19/2024     04/19/2024    K 4.3 04/19/2024     04/19/2024    CO2 30 04/19/2024    ANIONGAP 9 (L) 04/19/2024    BUN 15 04/19/2024    CREATININE 0.67 04/19/2024    CALCIUM 9.0 04/19/2024    ALBUMIN 4.2 04/19/2024        No results found for: \"BNP\", \"HGBA1C\"    Assessment/Plan   History of atypical chest pain:  EKG today showed ST & T wave abnormality, consider inferior and anterior ischemia, unchanged from prior EKG to 4/2023.  [facility] 7/20: with no evidence of CAD  Exercise stress echo 12/22: No clinical or echo evidence for ischemia  Chest pain at last office visit improved after PPI   Today, continues to deny any chest pain or pressure, stable.     Hypertension:   Today's /84, controlled.  -continue on current antihypertensives     Dyslipidemia:  FLP 4/2024, , acceptable since she has no h/o CAD or Fhx.  -continue on pravastatin     Chronic venous insufficiency:  Patient has chronic lower extremity edema L>R and takes furosemide 20 mg daily which she states mostly controls her edema. She has considered increasing dose of furosemide for summer.   -referred to vascular surgery in the past for further evaluation and available treatment options.     Scribe Attestation  By signing my name below, ILeda, Scrpamela   attest that this documentation has been prepared under the direction and in the presence of Дмитрий Clifford MD.  "

## 2025-01-10 ENCOUNTER — APPOINTMENT (OUTPATIENT)
Dept: CARDIOLOGY | Facility: HOSPITAL | Age: 55
End: 2025-01-10
Payer: COMMERCIAL

## 2025-01-10 VITALS
HEART RATE: 74 BPM | SYSTOLIC BLOOD PRESSURE: 132 MMHG | HEIGHT: 71 IN | WEIGHT: 220 LBS | BODY MASS INDEX: 30.8 KG/M2 | DIASTOLIC BLOOD PRESSURE: 84 MMHG

## 2025-01-10 DIAGNOSIS — R60.0 LOWER EXTREMITY EDEMA: ICD-10-CM

## 2025-01-10 DIAGNOSIS — I10 BENIGN ESSENTIAL HYPERTENSION: Primary | ICD-10-CM

## 2025-01-10 DIAGNOSIS — R94.39 ABNORMAL STRESS TEST: ICD-10-CM

## 2025-01-10 DIAGNOSIS — E78.5 HYPERLIPIDEMIA, UNSPECIFIED HYPERLIPIDEMIA TYPE: ICD-10-CM

## 2025-01-10 LAB
ATRIAL RATE: 74 BPM
P AXIS: 41 DEGREES
P OFFSET: 188 MS
P ONSET: 135 MS
PR INTERVAL: 170 MS
Q ONSET: 220 MS
QRS COUNT: 12 BEATS
QRS DURATION: 96 MS
QT INTERVAL: 382 MS
QTC CALCULATION(BAZETT): 424 MS
QTC FREDERICIA: 410 MS
R AXIS: 23 DEGREES
T AXIS: -13 DEGREES
T OFFSET: 411 MS
VENTRICULAR RATE: 74 BPM

## 2025-01-10 PROCEDURE — 93010 ELECTROCARDIOGRAM REPORT: CPT | Performed by: INTERNAL MEDICINE

## 2025-01-10 PROCEDURE — 3079F DIAST BP 80-89 MM HG: CPT | Performed by: INTERNAL MEDICINE

## 2025-01-10 PROCEDURE — 93005 ELECTROCARDIOGRAM TRACING: CPT | Performed by: INTERNAL MEDICINE

## 2025-01-10 PROCEDURE — 99214 OFFICE O/P EST MOD 30 MIN: CPT | Performed by: INTERNAL MEDICINE

## 2025-01-10 PROCEDURE — 3075F SYST BP GE 130 - 139MM HG: CPT | Performed by: INTERNAL MEDICINE

## 2025-01-10 PROCEDURE — 99214 OFFICE O/P EST MOD 30 MIN: CPT | Mod: 25 | Performed by: INTERNAL MEDICINE

## 2025-01-10 PROCEDURE — 3008F BODY MASS INDEX DOCD: CPT | Performed by: INTERNAL MEDICINE

## 2025-01-10 NOTE — PATIENT INSTRUCTIONS
Thanks for following up in office today.    1)  Please follow-up if you develop new chest pain for further workup.    2)  I encourage you to stay well-hydrated while on furosemide. You can also consider wearing compression stockings if swelling worsens.     3)  Please continue your cardiac medications as prescribed.    Follow up with Dr Clifford in 1 year  If you have any questions, please call us at [phone]

## 2025-04-09 ENCOUNTER — APPOINTMENT (OUTPATIENT)
Dept: OBSTETRICS AND GYNECOLOGY | Facility: CLINIC | Age: 55
End: 2025-04-09
Payer: COMMERCIAL

## 2025-04-09 VITALS
DIASTOLIC BLOOD PRESSURE: 82 MMHG | SYSTOLIC BLOOD PRESSURE: 124 MMHG | WEIGHT: 222 LBS | BODY MASS INDEX: 31.78 KG/M2 | HEIGHT: 70 IN

## 2025-04-09 DIAGNOSIS — Z13.820 SCREENING FOR OSTEOPOROSIS: Primary | ICD-10-CM

## 2025-04-09 DIAGNOSIS — Z01.419 WELL WOMAN EXAM: ICD-10-CM

## 2025-04-09 PROCEDURE — 3074F SYST BP LT 130 MM HG: CPT | Performed by: STUDENT IN AN ORGANIZED HEALTH CARE EDUCATION/TRAINING PROGRAM

## 2025-04-09 PROCEDURE — 3008F BODY MASS INDEX DOCD: CPT | Performed by: STUDENT IN AN ORGANIZED HEALTH CARE EDUCATION/TRAINING PROGRAM

## 2025-04-09 PROCEDURE — 3079F DIAST BP 80-89 MM HG: CPT | Performed by: STUDENT IN AN ORGANIZED HEALTH CARE EDUCATION/TRAINING PROGRAM

## 2025-04-09 PROCEDURE — 99396 PREV VISIT EST AGE 40-64: CPT | Performed by: STUDENT IN AN ORGANIZED HEALTH CARE EDUCATION/TRAINING PROGRAM

## 2025-04-09 NOTE — PROGRESS NOTES
Subjective   Patient ID: Leigha Lazaro is a 54 y.o. year old female patient presenting for   Chief Complaint   Patient presents with    est patient well woman    Annual Exam     No new concerns noted    .  Patient presents for well woman exam.  Sexual History: Not sexually active, she is  from her .  Menstrual History: Hysterectomy, left the ovaries.  Pregnancy History: 29 year old son. He is doing well. SVDx1.  Occupation: Retired teacher.    No unexplained weight loss. No weight gain. No fatigue. No fevers or chills. No night sweats. No vision changes. No difficulty swallowing. No dyspnea, chest pain, or orthopnea. No breast masses or nipple discharge. No nausea/vomiting. No diarrhea or constipation. No blood in stool. No burning urination or hesitancy. No hematuria. No dyspareunia or vaginal dryness. No vaginal bleeding. No numbness or tingling of the extremities. No hair loss, skin growths, or rashes/bruising.    Patient mother had breast cancer. She was her mother's caregiver.  Mother's Myriad testing was negative as well.    No family hx of: ovarian, uterine, cervical, and pancreatic cancer.    Grandmother: Colon cancer in her 60s.    Liver cancer in her father.            Review of Systems   All other systems reviewed and are negative.        Past Medical History:   Diagnosis Date    Personal history of other diseases of the circulatory system     H/O: HTN (hypertension)       Past Surgical History:   Procedure Laterality Date    BLADDER SURGERY  07/17/2017    Bladder Surgery    BREAST BIOPSY Left     excisional    HYSTERECTOMY  07/17/2017    Hysterectomy    SEPTOPLASTY  07/17/2017    Septoplasty    TONSILLECTOMY  07/17/2017    Tonsillectomy With Adenoidectomy       Social History     Socioeconomic History    Marital status: Single     Spouse name: Not on file    Number of children: Not on file    Years of education: Not on file    Highest education level: Not on file   Occupational History     Not on file   Tobacco Use    Smoking status: Never    Smokeless tobacco: Never   Vaping Use    Vaping status: Never Used   Substance and Sexual Activity    Alcohol use: Yes     Comment: social    Drug use: Never    Sexual activity: Not Currently   Other Topics Concern    Not on file   Social History Narrative    Not on file     Social Drivers of Health     Financial Resource Strain: Not on file   Food Insecurity: Not on file   Transportation Needs: Not on file   Physical Activity: Not on file   Stress: Not on file   Social Connections: Not on file   Intimate Partner Violence: Not on file   Housing Stability: Not on file           Objective   Physical Exam  General: A&Ox3  Head: Normocephalic, atraumatic  Heart/Lungs: RRR, No murmurs, gallops, or rubs. Lungs are CTAB.  Breast: Normal in appearance bilaterally. No skin changes. No rashes or bruises. No palpable masses.  Abdomen: Soft, nontender. BS+4. No bruising or masses.  Genitourinary: Labia and vagina normal in appearance. Uterus is surgically absent. No adnexal masses palpated.  Lower Extremities: No lower extremity Edema no palpable cords.     Assessment/Plan   Problem List Items Addressed This Visit       Well woman exam    Overview   Patient is doing well.   Screening labs up to date  DEXA scan ordered for screening of osteoporosis.  Hx of hysterectomy, pap not indicated.  Follow up annually.          Other Visit Diagnoses         Screening for osteoporosis    -  Primary    Relevant Orders    XR DEXA bone density    Vitamin D 25-Hydroxy,Total (for eval of Vitamin D levels)                   Hudson Lynn MD 04/20/24 3:41 PM

## 2025-04-14 ENCOUNTER — TELEPHONE (OUTPATIENT)
Dept: OBSTETRICS AND GYNECOLOGY | Facility: CLINIC | Age: 55
End: 2025-04-14
Payer: COMMERCIAL

## 2025-04-15 ENCOUNTER — TELEPHONE (OUTPATIENT)
Dept: OBSTETRICS AND GYNECOLOGY | Facility: HOSPITAL | Age: 55
End: 2025-04-15

## 2025-04-15 ENCOUNTER — CLINICAL SUPPORT (OUTPATIENT)
Dept: OBSTETRICS AND GYNECOLOGY | Facility: CLINIC | Age: 55
End: 2025-04-15
Payer: COMMERCIAL

## 2025-04-15 DIAGNOSIS — R35.0 INCREASED URINARY FREQUENCY: Primary | ICD-10-CM

## 2025-04-15 DIAGNOSIS — R35.0 URINARY FREQUENCY: ICD-10-CM

## 2025-04-15 DIAGNOSIS — R30.0 BURNING WITH URINATION: ICD-10-CM

## 2025-04-15 LAB
POC APPEARANCE, URINE: CLEAR
POC BILIRUBIN, URINE: NEGATIVE
POC BLOOD, URINE: ABNORMAL
POC COLOR, URINE: ABNORMAL
POC GLUCOSE, URINE: NEGATIVE MG/DL
POC KETONES, URINE: NEGATIVE MG/DL
POC LEUKOCYTES, URINE: ABNORMAL
POC NITRITE,URINE: NEGATIVE
POC PH, URINE: 5.5 PH
POC PROTEIN, URINE: NEGATIVE MG/DL
POC SPECIFIC GRAVITY, URINE: <=1.005
POC UROBILINOGEN, URINE: 0.2 EU/DL

## 2025-04-15 PROCEDURE — 81002 URINALYSIS NONAUTO W/O SCOPE: CPT | Performed by: STUDENT IN AN ORGANIZED HEALTH CARE EDUCATION/TRAINING PROGRAM

## 2025-04-15 RX ORDER — SULFAMETHOXAZOLE AND TRIMETHOPRIM 800; 160 MG/1; MG/1
1 TABLET ORAL 2 TIMES DAILY
Qty: 10 TABLET | Refills: 0 | Status: SHIPPED | OUTPATIENT
Start: 2025-04-15 | End: 2025-04-20

## 2025-04-15 NOTE — PROGRESS NOTES
Patient in office for nurse visit. Complaints of urinary urgency, frequency and burning with urination. Clean catch urine obtained and patient self swabbed for gram stain.

## 2025-04-18 LAB
APPEARANCE UR: CLEAR
BACTERIA #/AREA URNS HPF: ABNORMAL /HPF
BACTERIA UR CULT: ABNORMAL
BILIRUB UR QL STRIP: NEGATIVE
BV SCORE VAG QL: NORMAL
COLOR UR: YELLOW
GLUCOSE UR QL STRIP: NEGATIVE
HGB UR QL STRIP: ABNORMAL
HYALINE CASTS #/AREA URNS LPF: ABNORMAL /LPF
KETONES UR QL STRIP: NEGATIVE
LEUKOCYTE ESTERASE UR QL STRIP: ABNORMAL
NITRITE UR QL STRIP: NEGATIVE
PH UR STRIP: 5.5 [PH] (ref 5–8)
PROT UR QL STRIP: NEGATIVE
RBC #/AREA URNS HPF: ABNORMAL /HPF
SERVICE CMNT-IMP: ABNORMAL
SP GR UR STRIP: 1 (ref 1–1.03)
SQUAMOUS #/AREA URNS HPF: ABNORMAL /HPF
WBC #/AREA URNS HPF: ABNORMAL /HPF

## 2025-04-21 PROBLEM — Z01.419 WELL WOMAN EXAM: Status: ACTIVE | Noted: 2025-04-21

## 2025-04-28 ENCOUNTER — HOSPITAL ENCOUNTER (OUTPATIENT)
Dept: RADIOLOGY | Facility: CLINIC | Age: 55
Discharge: HOME | End: 2025-04-28
Payer: COMMERCIAL

## 2025-04-28 DIAGNOSIS — Z13.820 SCREENING FOR OSTEOPOROSIS: ICD-10-CM

## 2025-04-28 PROCEDURE — 77080 DXA BONE DENSITY AXIAL: CPT

## 2025-04-28 PROCEDURE — 77080 DXA BONE DENSITY AXIAL: CPT | Performed by: RADIOLOGY

## 2026-04-13 ENCOUNTER — APPOINTMENT (OUTPATIENT)
Dept: OBSTETRICS AND GYNECOLOGY | Facility: CLINIC | Age: 56
End: 2026-04-13
Payer: COMMERCIAL